# Patient Record
Sex: FEMALE | Race: WHITE
[De-identification: names, ages, dates, MRNs, and addresses within clinical notes are randomized per-mention and may not be internally consistent; named-entity substitution may affect disease eponyms.]

---

## 2018-02-12 ENCOUNTER — HOSPITAL ENCOUNTER (OUTPATIENT)
Dept: HOSPITAL 89 - ZZLCC | Age: 78
End: 2018-02-12
Attending: NURSE PRACTITIONER
Payer: MEDICARE

## 2018-02-12 ENCOUNTER — HOSPITAL ENCOUNTER (OUTPATIENT)
Dept: HOSPITAL 89 - LAB | Age: 78
End: 2018-02-12
Attending: NURSE PRACTITIONER
Payer: MEDICARE

## 2018-02-12 DIAGNOSIS — D45: Primary | ICD-10-CM

## 2018-02-12 DIAGNOSIS — D45: ICD-10-CM

## 2018-02-12 DIAGNOSIS — R09.02: ICD-10-CM

## 2018-02-12 DIAGNOSIS — M85.88: ICD-10-CM

## 2018-02-12 DIAGNOSIS — R19.8: Primary | ICD-10-CM

## 2018-02-12 DIAGNOSIS — I28.8: ICD-10-CM

## 2018-02-12 LAB — PLATELET COUNT, AUTOMATED: 317 K/UL (ref 150–450)

## 2018-02-12 PROCEDURE — 36415 COLL VENOUS BLD VENIPUNCTURE: CPT

## 2018-02-12 PROCEDURE — 84460 ALANINE AMINO (ALT) (SGPT): CPT

## 2018-02-12 PROCEDURE — 85027 COMPLETE CBC AUTOMATED: CPT

## 2018-02-12 PROCEDURE — 83880 ASSAY OF NATRIURETIC PEPTIDE: CPT

## 2018-02-12 PROCEDURE — 82728 ASSAY OF FERRITIN: CPT

## 2018-02-12 PROCEDURE — 83550 IRON BINDING TEST: CPT

## 2018-02-12 PROCEDURE — 84450 TRANSFERASE (AST) (SGOT): CPT

## 2018-02-12 PROCEDURE — 84520 ASSAY OF UREA NITROGEN: CPT

## 2018-02-12 PROCEDURE — 86140 C-REACTIVE PROTEIN: CPT

## 2018-02-12 PROCEDURE — 84295 ASSAY OF SERUM SODIUM: CPT

## 2018-02-12 PROCEDURE — 82310 ASSAY OF CALCIUM: CPT

## 2018-02-12 PROCEDURE — 82435 ASSAY OF BLOOD CHLORIDE: CPT

## 2018-02-12 PROCEDURE — 85379 FIBRIN DEGRADATION QUANT: CPT

## 2018-02-12 PROCEDURE — 71046 X-RAY EXAM CHEST 2 VIEWS: CPT

## 2018-02-12 PROCEDURE — 82668 ASSAY OF ERYTHROPOIETIN: CPT

## 2018-02-12 PROCEDURE — 82374 ASSAY BLOOD CARBON DIOXIDE: CPT

## 2018-02-12 PROCEDURE — 84132 ASSAY OF SERUM POTASSIUM: CPT

## 2018-02-12 PROCEDURE — 82040 ASSAY OF SERUM ALBUMIN: CPT

## 2018-02-12 PROCEDURE — 84075 ASSAY ALKALINE PHOSPHATASE: CPT

## 2018-02-12 PROCEDURE — 82947 ASSAY GLUCOSE BLOOD QUANT: CPT

## 2018-02-12 PROCEDURE — 84155 ASSAY OF PROTEIN SERUM: CPT

## 2018-02-12 PROCEDURE — 82247 BILIRUBIN TOTAL: CPT

## 2018-02-12 PROCEDURE — 82565 ASSAY OF CREATININE: CPT

## 2018-02-12 NOTE — RADIOLOGY IMAGING REPORT
FACILITY: [facility] 

 

PATIENT NAME: Yasmin Liz

: 1940

MR: [id number]

V: [id number]

EXAM DATE: 

ORDERING PHYSICIAN: SUZANNE CAMARILLO

TECHNOLOGIST: 

 

Location: [facility] - [place]

Patient: Yasmni Liz

: 1940

MRN: [id number]

Visit/Account:[id number]

Date of Sevice:  2018

 

ACCESSION #: [id number]

 

Exam type: CHEST PA AND LAT

 

History: Low O2 sats, shortness of breath

 

Comparison: 2007.

 

Findings:

 

Lungs are somewhat hyperinflated with chronic appearing interstitial markings.  There is no focal inf
iltrate, pleural effusion or pneumothorax.

 

Heart size is upper limits of normal.  Pulmonary arteries are enlarged.

 

The osseous structures demonstrate a scoliosis with osteopenia.  Mild wedging in the mid thoracic spi
ne is unchanged.

 

IMPRESSION:

 

1.  Pulmonary hyperinflation with chronic interstitial changes but no acute cardiopulmonary disease.

 

Report Dictated By: Thomas Dunphy, MD at 2018 3:49 PM

 

Report E-Signed By: Thomas Dunphy, MD  at 2018 3:52 PM

 

WSN:[email or web address]

## 2018-02-22 ENCOUNTER — HOSPITAL ENCOUNTER (OUTPATIENT)
Dept: HOSPITAL 89 - ZZLCC | Age: 78
End: 2018-02-22
Attending: NURSE PRACTITIONER
Payer: MEDICARE

## 2018-02-22 DIAGNOSIS — E78.5: Primary | ICD-10-CM

## 2018-02-22 DIAGNOSIS — E55.9: ICD-10-CM

## 2018-02-22 DIAGNOSIS — Z79.899: ICD-10-CM

## 2018-02-22 LAB
LDLC SERPL-MCNC: 59 MG/DL
PLATELET COUNT, AUTOMATED: 365 K/UL (ref 150–450)

## 2018-02-22 PROCEDURE — 84478 ASSAY OF TRIGLYCERIDES: CPT

## 2018-02-22 PROCEDURE — 84155 ASSAY OF PROTEIN SERUM: CPT

## 2018-02-22 PROCEDURE — 83718 ASSAY OF LIPOPROTEIN: CPT

## 2018-02-22 PROCEDURE — 82435 ASSAY OF BLOOD CHLORIDE: CPT

## 2018-02-22 PROCEDURE — 82310 ASSAY OF CALCIUM: CPT

## 2018-02-22 PROCEDURE — 84450 TRANSFERASE (AST) (SGOT): CPT

## 2018-02-22 PROCEDURE — 84295 ASSAY OF SERUM SODIUM: CPT

## 2018-02-22 PROCEDURE — 82565 ASSAY OF CREATININE: CPT

## 2018-02-22 PROCEDURE — 82040 ASSAY OF SERUM ALBUMIN: CPT

## 2018-02-22 PROCEDURE — 82306 VITAMIN D 25 HYDROXY: CPT

## 2018-02-22 PROCEDURE — 84460 ALANINE AMINO (ALT) (SGPT): CPT

## 2018-02-22 PROCEDURE — 84075 ASSAY ALKALINE PHOSPHATASE: CPT

## 2018-02-22 PROCEDURE — 82947 ASSAY GLUCOSE BLOOD QUANT: CPT

## 2018-02-22 PROCEDURE — 84132 ASSAY OF SERUM POTASSIUM: CPT

## 2018-02-22 PROCEDURE — 82374 ASSAY BLOOD CARBON DIOXIDE: CPT

## 2018-02-22 PROCEDURE — 85025 COMPLETE CBC W/AUTO DIFF WBC: CPT

## 2018-02-22 PROCEDURE — 84520 ASSAY OF UREA NITROGEN: CPT

## 2018-02-22 PROCEDURE — 82465 ASSAY BLD/SERUM CHOLESTEROL: CPT

## 2018-02-22 PROCEDURE — 82247 BILIRUBIN TOTAL: CPT

## 2018-04-05 ENCOUNTER — HOSPITAL ENCOUNTER (OUTPATIENT)
Dept: HOSPITAL 89 - ZZSENDIN | Age: 78
End: 2018-04-05
Attending: NURSE PRACTITIONER
Payer: MEDICARE

## 2018-04-05 DIAGNOSIS — R79.89: Primary | ICD-10-CM

## 2018-04-05 PROCEDURE — 84450 TRANSFERASE (AST) (SGOT): CPT

## 2018-04-05 PROCEDURE — 82435 ASSAY OF BLOOD CHLORIDE: CPT

## 2018-04-05 PROCEDURE — 84155 ASSAY OF PROTEIN SERUM: CPT

## 2018-04-05 PROCEDURE — 82310 ASSAY OF CALCIUM: CPT

## 2018-04-05 PROCEDURE — 82374 ASSAY BLOOD CARBON DIOXIDE: CPT

## 2018-04-05 PROCEDURE — 84520 ASSAY OF UREA NITROGEN: CPT

## 2018-04-05 PROCEDURE — 82947 ASSAY GLUCOSE BLOOD QUANT: CPT

## 2018-04-05 PROCEDURE — 84132 ASSAY OF SERUM POTASSIUM: CPT

## 2018-04-05 PROCEDURE — 84295 ASSAY OF SERUM SODIUM: CPT

## 2018-04-05 PROCEDURE — 84075 ASSAY ALKALINE PHOSPHATASE: CPT

## 2018-04-05 PROCEDURE — 84460 ALANINE AMINO (ALT) (SGPT): CPT

## 2018-04-05 PROCEDURE — 82040 ASSAY OF SERUM ALBUMIN: CPT

## 2018-04-05 PROCEDURE — 82565 ASSAY OF CREATININE: CPT

## 2018-04-05 PROCEDURE — 82247 BILIRUBIN TOTAL: CPT

## 2018-05-01 ENCOUNTER — HOSPITAL ENCOUNTER (OUTPATIENT)
Dept: HOSPITAL 89 - US | Age: 78
End: 2018-05-01
Attending: NURSE PRACTITIONER
Payer: MEDICARE

## 2018-05-01 DIAGNOSIS — Z90.49: ICD-10-CM

## 2018-05-01 DIAGNOSIS — N28.9: Primary | ICD-10-CM

## 2018-05-01 PROCEDURE — 76705 ECHO EXAM OF ABDOMEN: CPT

## 2018-05-01 NOTE — RADIOLOGY IMAGING REPORT
FACILITY: [facility] - [place], [place] 

 

PATIENT NAME: Yasmin Liz

: 1940

MR: [id number]

V: [id number]

EXAM DATE: 

ORDERING PHYSICIAN: SUZANNE CAMARILLO

TECHNOLOGIST: 

 

Location: [facility] - [place]

Patient: Yasmin Liz

: 1940

MRN: [id number]

Visit/Account:[id number]

Date of Sevice:  2018

 

ACCESSION #: [id number]

 

Abdominal ultrasound

 

Indication: Elevated LFTs

 

Comparison: None

 

Technique: Multiple grayscale, color and Doppler sonographic images were obtained for an ultrasound o
f the right upper quadrant.

 

Findings:

Liver is normal in size measuring 13.4 cm in length.  There are loculations within the capsule contou
r as well as heterogeneous echotexture.  There is normal hepatopedal portal venous flow.

 

The gallbladder surgically absent.

 

Common duct measures 8 mm in maximum diameter with no evidence of shadowing stone.

 

Imaged portions of the pancreas are unremarkable.

 

Abdominal aorta and IVC are patent and unremarkable.

 

The right kidney is diminutive in size measuring 9.4 x 3.6 x 4.6 cm.  Overall, there is increased ech
otexture throughout the right medullary regions of the kidney.

 

IMPRESSION:

1. Heterogeneous echotexture of the liver with a lobulated capsule.  These things are nonspecific but
 can be seen in the setting of liver dysfunction such as chronic hepatitis as well as cirrhosis.

2.  Chronic medical renal disease of the right kidney.

3.  Status post cholecystectomy.

 

Report Dictated By: Mango Phillips DO at 2018 10:10 AM

 

Report E-Signed By: Mango Phillips DO  at 2018 10:16 AM

 

WSN:[id number]

## 2018-08-23 ENCOUNTER — HOSPITAL ENCOUNTER (OUTPATIENT)
Dept: HOSPITAL 89 - ZZLCC | Age: 78
End: 2018-08-23
Attending: NURSE PRACTITIONER
Payer: MEDICARE

## 2018-08-23 DIAGNOSIS — J43.9: ICD-10-CM

## 2018-08-23 DIAGNOSIS — J44.1: ICD-10-CM

## 2018-08-23 DIAGNOSIS — M25.551: ICD-10-CM

## 2018-08-23 DIAGNOSIS — E78.00: Primary | ICD-10-CM

## 2018-08-23 DIAGNOSIS — G30.9: ICD-10-CM

## 2018-08-23 DIAGNOSIS — E78.5: ICD-10-CM

## 2018-08-23 DIAGNOSIS — M81.0: ICD-10-CM

## 2018-08-23 DIAGNOSIS — R53.1: ICD-10-CM

## 2018-08-23 DIAGNOSIS — R26.2: ICD-10-CM

## 2018-08-23 DIAGNOSIS — K21.9: ICD-10-CM

## 2018-08-23 DIAGNOSIS — F32.9: ICD-10-CM

## 2018-08-23 DIAGNOSIS — F01.50: ICD-10-CM

## 2018-08-23 PROCEDURE — 87350 HEPATITIS BE AG IA: CPT

## 2018-08-23 PROCEDURE — 87340 HEPATITIS B SURFACE AG IA: CPT

## 2018-08-23 PROCEDURE — 86707 HEPATITIS BE ANTIBODY: CPT

## 2018-08-23 PROCEDURE — 86706 HEP B SURFACE ANTIBODY: CPT

## 2018-08-28 ENCOUNTER — HOSPITAL ENCOUNTER (OUTPATIENT)
Dept: HOSPITAL 89 - RAD | Age: 78
End: 2018-08-28
Attending: NURSE PRACTITIONER
Payer: MEDICARE

## 2018-08-28 DIAGNOSIS — M25.551: ICD-10-CM

## 2018-08-28 DIAGNOSIS — M85.80: Primary | ICD-10-CM

## 2018-08-28 NOTE — RADIOLOGY IMAGING REPORT
FACILITY: [facility] - [place] 

 

PATIENT NAME: Yasmin Liz

: 1940

MR: [id number]

V: [id number]

EXAM DATE: 

ORDERING PHYSICIAN: SUZANNE CAMARILLO

TECHNOLOGIST: 

 

Location: [facility]

Patient: Yasmin Liz

: 1940

MRN: [id number]

Visit/Account:[id number]

Date of Sevice:  2018

 

ACCESSION #: [id number]

 

HIP RIGHT

 

Indication: Hip pain.

 

Comparison: None available

 

Findings:

There is no acute fracture or dislocation of the right hip.

Mild degenerative changes right hip joint noted

 

The pelvis is intact.  No bony abnormalities are noted.

 

IMPRESSION:

1. No acute osseous abnormality right hip as above

 

 

Report Dictated By: Jose Bryant at 2018 2:44 PM

 

Report E-Signed By: Jose Bryant  at 2018 2:45 PM

 

WSN:[id number]

## 2018-10-18 ENCOUNTER — HOSPITAL ENCOUNTER (OUTPATIENT)
Dept: HOSPITAL 89 - ZZLCC | Age: 78
End: 2018-10-18
Attending: NURSE PRACTITIONER
Payer: MEDICARE

## 2018-10-18 DIAGNOSIS — R74.8: Primary | ICD-10-CM

## 2018-10-18 DIAGNOSIS — J44.9: ICD-10-CM

## 2018-10-18 DIAGNOSIS — E78.5: ICD-10-CM

## 2018-10-18 LAB
LDLC SERPL-MCNC: 59 MG/DL
PLATELET COUNT, AUTOMATED: 380 K/UL (ref 150–450)

## 2018-10-18 PROCEDURE — 84520 ASSAY OF UREA NITROGEN: CPT

## 2018-10-18 PROCEDURE — 84450 TRANSFERASE (AST) (SGOT): CPT

## 2018-10-18 PROCEDURE — 82374 ASSAY BLOOD CARBON DIOXIDE: CPT

## 2018-10-18 PROCEDURE — 83718 ASSAY OF LIPOPROTEIN: CPT

## 2018-10-18 PROCEDURE — 85027 COMPLETE CBC AUTOMATED: CPT

## 2018-10-18 PROCEDURE — 82247 BILIRUBIN TOTAL: CPT

## 2018-10-18 PROCEDURE — 86803 HEPATITIS C AB TEST: CPT

## 2018-10-18 PROCEDURE — 84478 ASSAY OF TRIGLYCERIDES: CPT

## 2018-10-18 PROCEDURE — 84460 ALANINE AMINO (ALT) (SGPT): CPT

## 2018-10-18 PROCEDURE — 82040 ASSAY OF SERUM ALBUMIN: CPT

## 2018-10-18 PROCEDURE — 84075 ASSAY ALKALINE PHOSPHATASE: CPT

## 2018-10-18 PROCEDURE — 82947 ASSAY GLUCOSE BLOOD QUANT: CPT

## 2018-10-18 PROCEDURE — 82310 ASSAY OF CALCIUM: CPT

## 2018-10-18 PROCEDURE — 84295 ASSAY OF SERUM SODIUM: CPT

## 2018-10-18 PROCEDURE — 82465 ASSAY BLD/SERUM CHOLESTEROL: CPT

## 2018-10-18 PROCEDURE — 84155 ASSAY OF PROTEIN SERUM: CPT

## 2018-10-18 PROCEDURE — 82435 ASSAY OF BLOOD CHLORIDE: CPT

## 2018-10-18 PROCEDURE — 84132 ASSAY OF SERUM POTASSIUM: CPT

## 2018-10-18 PROCEDURE — 82565 ASSAY OF CREATININE: CPT

## 2019-04-24 ENCOUNTER — HOSPITAL ENCOUNTER (OUTPATIENT)
Dept: HOSPITAL 89 - ZZLCC | Age: 79
End: 2019-04-24
Attending: NURSE PRACTITIONER
Payer: MEDICARE

## 2019-04-24 DIAGNOSIS — Z79.01: ICD-10-CM

## 2019-04-24 DIAGNOSIS — I69.30: ICD-10-CM

## 2019-04-24 DIAGNOSIS — K75.81: ICD-10-CM

## 2019-04-24 DIAGNOSIS — E78.5: Primary | ICD-10-CM

## 2019-04-24 LAB
LDLC SERPL-MCNC: 66 MG/DL
PLATELET COUNT, AUTOMATED: 336 K/UL (ref 150–450)

## 2019-04-24 PROCEDURE — 84450 TRANSFERASE (AST) (SGOT): CPT

## 2019-04-24 PROCEDURE — 83718 ASSAY OF LIPOPROTEIN: CPT

## 2019-04-24 PROCEDURE — 82040 ASSAY OF SERUM ALBUMIN: CPT

## 2019-04-24 PROCEDURE — 82465 ASSAY BLD/SERUM CHOLESTEROL: CPT

## 2019-04-24 PROCEDURE — 85025 COMPLETE CBC W/AUTO DIFF WBC: CPT

## 2019-04-24 PROCEDURE — 82565 ASSAY OF CREATININE: CPT

## 2019-04-24 PROCEDURE — 82310 ASSAY OF CALCIUM: CPT

## 2019-04-24 PROCEDURE — 84443 ASSAY THYROID STIM HORMONE: CPT

## 2019-04-24 PROCEDURE — 82374 ASSAY BLOOD CARBON DIOXIDE: CPT

## 2019-04-24 PROCEDURE — 84460 ALANINE AMINO (ALT) (SGPT): CPT

## 2019-04-24 PROCEDURE — 82435 ASSAY OF BLOOD CHLORIDE: CPT

## 2019-04-24 PROCEDURE — 84075 ASSAY ALKALINE PHOSPHATASE: CPT

## 2019-04-24 PROCEDURE — 84155 ASSAY OF PROTEIN SERUM: CPT

## 2019-04-24 PROCEDURE — 84132 ASSAY OF SERUM POTASSIUM: CPT

## 2019-04-24 PROCEDURE — 82947 ASSAY GLUCOSE BLOOD QUANT: CPT

## 2019-04-24 PROCEDURE — 84295 ASSAY OF SERUM SODIUM: CPT

## 2019-04-24 PROCEDURE — 82247 BILIRUBIN TOTAL: CPT

## 2019-04-24 PROCEDURE — 84520 ASSAY OF UREA NITROGEN: CPT

## 2019-04-24 PROCEDURE — 84478 ASSAY OF TRIGLYCERIDES: CPT
